# Patient Record
Sex: MALE | Race: WHITE | NOT HISPANIC OR LATINO | Employment: PART TIME | URBAN - NONMETROPOLITAN AREA
[De-identification: names, ages, dates, MRNs, and addresses within clinical notes are randomized per-mention and may not be internally consistent; named-entity substitution may affect disease eponyms.]

---

## 2020-05-27 ENCOUNTER — LAB REQUISITION (OUTPATIENT)
Dept: LAB | Facility: HOSPITAL | Age: 25
End: 2020-05-27

## 2020-05-27 DIAGNOSIS — Z11.59 ENCOUNTER FOR SCREENING FOR OTHER VIRAL DISEASES: ICD-10-CM

## 2020-05-27 PROCEDURE — U0004 COV-19 TEST NON-CDC HGH THRU: HCPCS | Performed by: INTERNAL MEDICINE

## 2020-05-27 PROCEDURE — U0002 COVID-19 LAB TEST NON-CDC: HCPCS | Performed by: INTERNAL MEDICINE

## 2020-05-28 LAB
REF LAB TEST METHOD: NORMAL
SARS-COV-2 RNA RESP QL NAA+PROBE: NOT DETECTED

## 2020-06-23 ENCOUNTER — TELEPHONE (OUTPATIENT)
Dept: URGENT CARE | Facility: CLINIC | Age: 25
End: 2020-06-23

## 2020-06-23 DIAGNOSIS — L03.011 PARONYCHIA OF RIGHT RING FINGER: Primary | ICD-10-CM

## 2020-06-23 RX ORDER — CLINDAMYCIN HYDROCHLORIDE 300 MG/1
300 CAPSULE ORAL 3 TIMES DAILY
Qty: 30 CAPSULE | Refills: 0 | Status: SHIPPED | OUTPATIENT
Start: 2020-06-23

## 2022-05-11 ENCOUNTER — OFFICE VISIT (OUTPATIENT)
Dept: ORTHOPEDIC SURGERY | Age: 27
End: 2022-05-11

## 2022-05-11 VITALS — BODY MASS INDEX: 26.66 KG/M2 | WEIGHT: 160 LBS | HEIGHT: 65 IN

## 2022-05-11 DIAGNOSIS — S62.141A CLOSED DISPLACED FRACTURE OF BODY OF HAMATE OF RIGHT WRIST, INITIAL ENCOUNTER: Primary | ICD-10-CM

## 2022-05-11 DIAGNOSIS — M67.431 GANGLION, RIGHT WRIST: ICD-10-CM

## 2022-05-11 PROCEDURE — 99203 OFFICE O/P NEW LOW 30 MIN: CPT | Performed by: ORTHOPAEDIC SURGERY

## 2022-05-11 RX ORDER — NAPROXEN 375 MG/1
375 TABLET ORAL 2 TIMES DAILY WITH MEALS
COMMUNITY
End: 2022-07-01

## 2022-05-11 NOTE — PROGRESS NOTES
Audrey Khan (: 1995) is a 32 y.o. male patient here for evaluation of the following chief complaint(s):  Hand Pain (right hand)       ASSESSMENT/PLAN:  Below is the assessment and plan developed based on review of pertinent history, physical exam, labs, studies, and medications. 1. Closed displaced fracture of body of hamate of right wrist, initial encounter  -     CT UP EXT RT WO CONT; Future  2. Ganglion, right wrist      77-year-old male with right ring finger metacarpal base intra-articular fracture and intra-articular hamate fracture. I discussed treatment options with the patient and he may need operative intervention. We will obtain a CT scan of his hand to better evaluate the fracture fragments. He will follow-up to discuss those results. Patient verbalized understanding and elected to proceed. All questions were answered to the patient's apparent satisfaction. SUBJECTIVE/OBJECTIVE:  HPI    77-year-old male with a right hand injury. He states he punched a refrigerator yesterday morning. He was seen at [facility].  He was placed in a splint. Patient reports a sudden onset of symptoms. Duration of problem 1 day, began 5/10/2022. Symptom Severity 5/10  Symptom Frequency constant        Allergies   Allergen Reactions    Demerol [Meperidine] Rash       Current Outpatient Medications   Medication Sig    naproxen (NAPROSYN) 375 mg tablet Take 375 mg by mouth two (2) times daily (with meals). No current facility-administered medications for this visit.        Social History     Socioeconomic History    Marital status: SINGLE     Spouse name: Not on file    Number of children: Not on file    Years of education: Not on file    Highest education level: Not on file   Occupational History    Not on file   Tobacco Use    Smoking status: Current Every Day Smoker    Smokeless tobacco: Never Used   Vaping Use    Vaping Use: Some days    Substances: Nicotine    Devices: Pre-filled or refillable cartridge   Substance and Sexual Activity    Alcohol use: Yes    Drug use: Never    Sexual activity: Yes     Partners: Female   Other Topics Concern    Not on file   Social History Narrative    Not on file     Social Determinants of Health     Financial Resource Strain:     Difficulty of Paying Living Expenses: Not on file   Food Insecurity:     Worried About Running Out of Food in the Last Year: Not on file    Leslee of Food in the Last Year: Not on file   Transportation Needs:     Lack of Transportation (Medical): Not on file    Lack of Transportation (Non-Medical): Not on file   Physical Activity:     Days of Exercise per Week: Not on file    Minutes of Exercise per Session: Not on file   Stress:     Feeling of Stress : Not on file   Social Connections:     Frequency of Communication with Friends and Family: Not on file    Frequency of Social Gatherings with Friends and Family: Not on file    Attends Christian Services: Not on file    Active Member of [address] [place] [address] or Organizations: Not on file    Attends Club or Organization Meetings: Not on file    Marital Status: Not on file   Intimate Partner Violence:     Fear of Current or Ex-Partner: Not on file    Emotionally Abused: Not on file    Physically Abused: Not on file    Sexually Abused: Not on file   Housing Stability:     Unable to Pay for Housing in the Last Year: Not on file    Number of [place] in the Last Year: Not on file    Unstable Housing in the Last Year: Not on file       Past Surgical History:   Procedure Laterality Date    FOOT/TOES SURGERY PROC UNLISTED      R foot MRSA       History reviewed. No pertinent family history. Review of Systems    No flowsheet data found. Vitals:  Ht 5' 5\" (1.651 m)   Wt 160 lb (72.6 kg)   BMI 26.63 kg/m²    Estimated body surface area is 1.82 meters squared as calculated from the following:    Height as of this encounter: 5' 5\" (1.651 m).     Weight as of this encounter: 160 lb (72.6 kg). Body mass index is 26.63 kg/m². Physical Exam    Musculoskeletal Exam:    Right Upper Extremity EXAMINATION    Patient has tenderness to palpation at the right hand both over the ring finger metacarpal base and hamate. No other areas of tenderness to palpation. He has intact flexion and extension with no obvious malrotation. There is ecchymosis noted. Mild edema. The patient's splint was removed for examination and he reapplied it afterwards. Patient fires AIN, PIN and ulnar nerves. Sensation is grossly intact in the median, radial and ulnar distribution. Hand is pink and appears well-perfused. Hand is warm. Skin is intact. Compartments are soft and compressible. Consitutional: Healthy  Skin:   - Edema - mild  - Cellulitis - No    Neuro: Numbness or tingling in R/L arm: No    Psych: Affect normal    Cardiovascular: Capillary Refill < 2 seconds in upper extremities    Respiratory: Non-Labored Breathing    ROS:    Constitutional: Denies fever/chills    Respiratory: Denies SOB        Imaging:    I reviewed the x-rays from [facility] noting intra-articular hamate fracture and base of the ring finger metacarpal fracture which is also intra-articular. XR Results (most recent):  No results found for this or any previous visit. Orders Placed This Encounter    CT UP EXT RT WO CONT     Standing Status:   Future     Standing Expiration Date:   6/11/2023     Order Specific Question:   Reason for Exam     Answer:   eval hamate/fourth metacarpal fracture              An electronic signature was used to authenticate this note.   -- Kavita Carias MD

## 2022-05-11 NOTE — PATIENT INSTRUCTIONS
Broken Hand    The bones of the hand serve as a framework. This framework supports the muscles that make the wrist and fingers move. When one of these hand bones is broken (fractured), it can prevent you from using the hand, wrist and fingers. Many people think that a fracture is different from a break, but they are the same (see Figure 1). There can be different variations of a fracture, including:    Stable fracture, when the bone pieces are aligned    Unstable fracture, when there are bone fragments that have shifted    Comminuted fracture, when the bone is shattered into many pieces    Open (compound) fracture, when a bone fragment breaks through the skin. This causes risk of infection. Figure 1  Examples of a broken hand, specifically the fingers        Figure 2  Examples of plates pins and screws used while a broken hand heals        Causes    A broken hand can occur when enough force is applied to a bone to break it. Signs and Symptoms    Symptoms of a broken hand can include:    Pain and stiffness  Difficulty moving the hand, wrist and/or fingers  Deformities such as a crooked finger (this is less likely)    Treatment    Medical evaluation and x-rays are usually needed for your doctor to diagnose the fracture and determine the treatment. Depending on the type of fracture, your hand surgeon may recommend one of several treatment methods. A splint or cast may be used to treat a stable fracture Some unstable fractures, in which the bone has moved, may need to be set and then held in place with wires or pins. This is done without surgery. More serious fractures may need surgery to set the bone and hold the bone fragments together with pins, plates or screws (see Figure 2). Sometimes, bone may be missing or be so severely crushed that it cannot be repaired. In such cases, a bone graft may be necessary. In this procedure, bone is taken from another part of the body.     Recovery    Sometimes, a bony lump may appear at the spot of the broken bone during recovery known as a fracture callus.  This is normal, and the lump usually gets smaller over time. Some problems you may have while your broken hand is healing include:    Stiffness    Shifting of bone  Infection  Slow healing  To increase your chances of a healthy recovery, do not smoke, and carefully follow your doctors instructions. Your doctor may recommend hand therapy to improve the process. It is important to note that not all fractures completely heal. Because bones have such a close relationship with ligaments and tendons, the hand may be stiff and weak even after the healing process. Some fractures may lead to arthritis down the road. In addition, fractures in children occasionally affect future growth of that bone.

## 2022-05-11 NOTE — LETTER
5/11/2022 9:10 AM    Mr. Colin Prather  [address] [address]    Mr. Tim was seen in the office today by Dr. Mohamud Whaley. Please excuse him from work beginning 5-11-22 through 5-25-22 or until advised by physician. If you have any questions or concerns please feel free to give us a call at [phone].           Sincerely,      Stan Siddiqi MD

## 2022-05-12 ENCOUNTER — HOSPITAL ENCOUNTER (OUTPATIENT)
Dept: CT IMAGING | Age: 27
Discharge: HOME | End: 2022-05-12
Attending: ORTHOPAEDIC SURGERY
Payer: MEDICAID

## 2022-05-12 DIAGNOSIS — S62.141A CLOSED DISPLACED FRACTURE OF BODY OF HAMATE OF RIGHT WRIST, INITIAL ENCOUNTER: ICD-10-CM

## 2022-05-12 PROCEDURE — 73200 CT UPPER EXTREMITY W/O DYE: CPT

## 2022-05-13 ENCOUNTER — TELEPHONE (OUTPATIENT)
Dept: ORTHOPEDIC SURGERY | Age: 27
End: 2022-05-13

## 2022-05-13 NOTE — TELEPHONE ENCOUNTER
I discussed treatment options with the patient after reviewing his CT scan. We discussed the intra-articular nature of the fracture as well as subluxation of the ring and small finger metacarpals. In order to provide the optimum outcome we discussed operative intervention with open reduction internal fixation as well as pinning fixation. Patient does want to proceed with operative intervention. We also discussed nonoperative treatment. We will work to get him scheduled next week. We discussed risks, benefits and alternatives to surgery. After thorough discussion ultimately the patient elected to proceed with operative intervention. We discussed the risks including but not limited to postoperative pain, swelling, bruising, bleeding, scarring, infection, damage to neurovascular structures. We also discussed permanent or temporary loss of range of motion, need for additional surgery, rejection of foreign material such as metal, suture or other tissue. We discussed risk of blood clots. Plan is for right hamate open reduction internal fixation, right ring and small finger metacarpal open reduction internal fixation.

## 2022-05-16 DIAGNOSIS — S62.141A CLOSED DISPLACED FRACTURE OF BODY OF HAMATE OF RIGHT WRIST, INITIAL ENCOUNTER: Primary | ICD-10-CM

## 2022-05-17 DIAGNOSIS — S62.141A CLOSED DISPLACED FRACTURE OF BODY OF HAMATE OF RIGHT WRIST, INITIAL ENCOUNTER: Primary | ICD-10-CM

## 2022-05-17 RX ORDER — HYDROCODONE BITARTRATE AND ACETAMINOPHEN 5; 325 MG/1; MG/1
1 TABLET ORAL
Qty: 15 TABLET | Refills: 0 | Status: SHIPPED | OUTPATIENT
Start: 2022-05-17 | End: 2022-05-22

## 2022-06-01 ENCOUNTER — OFFICE VISIT (OUTPATIENT)
Dept: ORTHOPEDIC SURGERY | Age: 27
End: 2022-06-01

## 2022-06-01 VITALS — BODY MASS INDEX: 26.66 KG/M2 | WEIGHT: 160 LBS | HEIGHT: 65 IN

## 2022-06-01 DIAGNOSIS — S62.141D CLOSED DISPLACED FRACTURE OF BODY OF HAMATE OF RIGHT WRIST WITH ROUTINE HEALING, SUBSEQUENT ENCOUNTER: Primary | ICD-10-CM

## 2022-06-01 PROCEDURE — 99024 POSTOP FOLLOW-UP VISIT: CPT | Performed by: ORTHOPAEDIC SURGERY

## 2022-06-01 NOTE — PROGRESS NOTES
Jonah De Leon (: 1995) is a 32 y.o. male patient here for evaluation of the following chief complaint(s):  Hand Pain (right hand sx )       ASSESSMENT/PLAN:  Below is the assessment and plan developed based on review of pertinent history, physical exam, labs, studies, and medications. 1. Closed displaced fracture of body of hamate of right wrist with routine healing, subsequent encounter  -     XR HAND RT MIN 3 V; Future      Patient is doing well at this time. Recommended pin care daily. Nonweightbearing the right upper extremity. He will continue to stay in the splint and work on digit range of motion as well. Follow-up in 1 month and plan for pin removal.    He will get x-rays first 3 views of the right hand. He will monitor for signs of infection. Patient verbalized understanding and elected to proceed. All questions were answered to the patient's apparent satisfaction. SUBJECTIVE/OBJECTIVE:    Patient is here today for a postoperative visit. Date of Surgery: 22    Patient is overall reporting he is doing well today no major pain. No fevers or chills. Allergies   Allergen Reactions    Demerol [Meperidine] Rash       Current Outpatient Medications   Medication Sig    naproxen (NAPROSYN) 375 mg tablet Take 375 mg by mouth two (2) times daily (with meals). No current facility-administered medications for this visit. Social History     Socioeconomic History    Marital status: SINGLE     Spouse name: Not on file    Number of children: Not on file    Years of education: Not on file    Highest education level: Not on file   Occupational History    Not on file   Tobacco Use    Smoking status: Current Every Day Smoker    Smokeless tobacco: Never Used   Vaping Use    Vaping Use: Some days    Substances: Nicotine    Devices: Pre-filled or refillable cartridge   Substance and Sexual Activity    Alcohol use:  Yes    Drug use: Never    Sexual activity: Yes     Partners: Female   Other Topics Concern    Not on file   Social History Narrative    Not on file     Social Determinants of Health     Financial Resource Strain:     Difficulty of Paying Living Expenses: Not on file   Food Insecurity:     Worried About Running Out of Food in the Last Year: Not on file    Leslee of Food in the Last Year: Not on file   Transportation Needs:     Lack of Transportation (Medical): Not on file    Lack of Transportation (Non-Medical): Not on file   Physical Activity:     Days of Exercise per Week: Not on file    Minutes of Exercise per Session: Not on file   Stress:     Feeling of Stress : Not on file   Social Connections:     Frequency of Communication with Friends and Family: Not on file    Frequency of Social Gatherings with Friends and Family: Not on file    Attends Holiness Services: Not on file    Active Member of [address] [place] [address] [employer] or Organizations: Not on file    Attends Club or Organization Meetings: Not on file    Marital Status: Not on file   Intimate Partner Violence:     Fear of Current or Ex-Partner: Not on file    Emotionally Abused: Not on file    Physically Abused: Not on file    Sexually Abused: Not on file   Housing Stability:     Unable to Pay for Housing in the Last Year: Not on file    Number of [place] in the Last Year: Not on file    Unstable Housing in the Last Year: Not on file       Past Surgical History:   Procedure Laterality Date    FOOT/TOES SURGERY PROC UNLISTED      R foot MRSA       History reviewed. No pertinent family history. Review of Systems    No flowsheet data found. Vitals:  Ht 5' 5\" (1.651 m)   Wt 160 lb (72.6 kg)   BMI 26.63 kg/m²    Estimated body surface area is 1.82 meters squared as calculated from the following:    Height as of this encounter: 5' 5\" (1.651 m). Weight as of this encounter: 160 lb (72.6 kg). Body mass index is 26.63 kg/m².        Physical Exam    Musculoskeletal Exam:    Right Upper Extremity Exam:    Evaluation of the patient's postoperative site shows that the incision is intact and healing appropriately. There are no signs of infection, no redness, no warmth, no erythema. There is no purulent drainage noted. Patient fires AIN, PIN and ulnar nerves. Sensation is grossly intact in the median, radial and ulnar distribution. Hand is pink and appears well-perfused. Hand is warm. Consitutional: Healthy  Skin:   - Edema - mild  - Cellulitis - No    Neuro: Numbness or tingling in R/L arm: Reports some tingling mildly over the small finger which is improving he states. Psych: Affect normal    Cardiovascular: Capillary Refill < 2 seconds in upper extremities    Respiratory: Non-Labored Breathing      ROS:    Constitutional: Denies fever/chills    Respiratory: Denies SOB        Imaging:    XR Results (most recent):  Results from Appointment encounter on 06/01/22    XR HAND RT MIN 3 V    Narrative  Right Hand Xray  Indication: pain  Views: 3 views, AP/LAT/OBL    Interpretation: 3 views of the right hand are reviewed and show appropriate changes postoperatively. Hardware remains in appropriate position and the fractures are very well aligned. No interval changes are noted          Orders Placed This Encounter    XR HAND RT MIN 3 V     Please perform x-rays of the right hand to include an AP lateral and oblique views     Standing Status:   Future     Number of Occurrences:   1     Standing Expiration Date:   7/1/2022     Order Specific Question:   Reason for Exam     Answer:   RIGHT HAND PAIN        Procedures:    Patient was rewrapped back in his old splint. An electronic signature was used to authenticate this note.   -- Annie Casas MD

## 2022-07-01 ENCOUNTER — OFFICE VISIT (OUTPATIENT)
Dept: ORTHOPEDIC SURGERY | Age: 27
End: 2022-07-01

## 2022-07-01 VITALS — WEIGHT: 158 LBS | BODY MASS INDEX: 26.33 KG/M2 | HEIGHT: 65 IN

## 2022-07-01 DIAGNOSIS — S62.141D CLOSED DISPLACED FRACTURE OF BODY OF HAMATE OF RIGHT WRIST WITH ROUTINE HEALING, SUBSEQUENT ENCOUNTER: Primary | ICD-10-CM

## 2022-07-01 PROCEDURE — 99024 POSTOP FOLLOW-UP VISIT: CPT | Performed by: ORTHOPAEDIC SURGERY

## 2022-07-01 RX ORDER — NAPROXEN 500 MG/1
500 TABLET ORAL 2 TIMES DAILY
COMMUNITY
Start: 2022-05-10

## 2022-07-01 NOTE — PROGRESS NOTES
Eh Amaya (: 1995) is a 32 y.o. male patient here for evaluation of the following chief complaint(s):  Hand Pain (1mo po right hand)       ASSESSMENT/PLAN:  Below is the assessment and plan developed based on review of pertinent history, physical exam, labs, studies, and medications. 1. Closed displaced fracture of body of hamate of right wrist with routine healing, subsequent encounter  -     XR WRIST RT AP/LAT/OBL MIN 3V; Future      Patient is doing well at this time. We discussed range of motion exercises. We discussed adhesions can form over the extensor tendons. He was given home exercise plan. He will obtain a wrist brace from the store as he does not have insurance and is worried about cost from DME. Follow-up in 6 weeks for likely final check. He will get x-rays first 3 views of the right hand. Patient verbalized understanding and elected to proceed. All questions were answered to the patient's apparent satisfaction. SUBJECTIVE/OBJECTIVE:    Patient is here today for a postoperative visit. Date of Surgery: 22    Patient is overall reporting he is doing well today no major pain. No fevers or chills. Allergies   Allergen Reactions    Demerol [Meperidine] Rash       Current Outpatient Medications   Medication Sig    naproxen (NAPROSYN) 500 mg tablet Take 500 mg by mouth two (2) times a day. No current facility-administered medications for this visit.        Social History     Socioeconomic History    Marital status: SINGLE     Spouse name: Not on file    Number of children: Not on file    Years of education: Not on file    Highest education level: Not on file   Occupational History    Not on file   Tobacco Use    Smoking status: Current Every Day Smoker    Smokeless tobacco: Never Used   Vaping Use    Vaping Use: Some days    Substances: Nicotine    Devices: Pre-filled or refillable cartridge   Substance and Sexual Activity    Alcohol use: Yes    Drug use: Never    Sexual activity: Yes     Partners: Female   Other Topics Concern    Not on file   Social History Narrative    Not on file     Social Determinants of Health     Financial Resource Strain:     Difficulty of Paying Living Expenses: Not on file   Food Insecurity:     Worried About Running Out of Food in the Last Year: Not on file    Leslee of Food in the Last Year: Not on file   Transportation Needs:     Lack of Transportation (Medical): Not on file    Lack of Transportation (Non-Medical): Not on file   Physical Activity:     Days of Exercise per Week: Not on file    Minutes of Exercise per Session: Not on file   Stress:     Feeling of Stress : Not on file   Social Connections:     Frequency of Communication with Friends and Family: Not on file    Frequency of Social Gatherings with Friends and Family: Not on file    Attends Taoism Services: Not on file    Active Member of [address] [place] [address] or Organizations: Not on file    Attends Club or Organization Meetings: Not on file    Marital Status: Not on file   Intimate Partner Violence:     Fear of Current or Ex-Partner: Not on file    Emotionally Abused: Not on file    Physically Abused: Not on file    Sexually Abused: Not on file   Housing Stability:     Unable to Pay for Housing in the Last Year: Not on file    Number of [place] in the Last Year: Not on file    Unstable Housing in the Last Year: Not on file       Past Surgical History:   Procedure Laterality Date    FOOT/TOES SURGERY PROC UNLISTED      R foot MRSA       History reviewed. No pertinent family history. Review of Systems    No flowsheet data found. Vitals:  Ht 5' 5\" (1.651 m)   Wt 158 lb (71.7 kg)   BMI 26.29 kg/m²    Estimated body surface area is 1.81 meters squared as calculated from the following:    Height as of this encounter: 5' 5\" (1.651 m). Weight as of this encounter: 158 lb (71.7 kg). Body mass index is 26.29 kg/m². Physical Exam    Musculoskeletal Exam:    Right Upper Extremity Exam:    Evaluation of the patient's postoperative site shows that the incision is intact and healed appropriately. Pin sites are clean and dry. There are no signs of infection, no redness, no warmth, no erythema. There is no purulent drainage noted. Patient fires AIN, PIN and ulnar nerves. Sensation is grossly intact in the median, radial and ulnar distribution. Hand is pink and appears well-perfused. Hand is warm. Consitutional: Healthy  Skin:   - Edema - mild  - Cellulitis - No    Neuro: Numbness or tingling in R/L arm: None reported today    Psych: Affect normal    Cardiovascular: Capillary Refill < 2 seconds in upper extremities    Respiratory: Non-Labored Breathing      ROS:    Constitutional: Denies fever/chills    Respiratory: Denies SOB        Imaging:    XR Results (most recent):  Results from Appointment encounter on 07/01/22    XR WRIST RT AP/LAT/OBL MIN 3V    Narrative  Right Hand Xray  Indication: pain  Views: 3 views, AP/LAT/OBL    Interpretation: 3 views of the right hand are reviewed and show appropriate changes postoperatively. Hardware remains in appropriate position and the fractures are very well aligned. No interval changes are noted of the alignment today. There is some interval healing noted today. Orders Placed This Encounter    XR WRIST RT AP/LAT/OBL MIN 3V     Please perform 3 views of the right wrist to include an AP lateral and oblique view. Standing Status:   Future     Number of Occurrences:   1     Standing Expiration Date:   8/1/2022     Order Specific Question:   Reason for Exam     Answer:   RIGHT WRIST PAIN        Procedures:    Pins removed without difficulty today. Minimal bleeding and clean bandage was applied. An electronic signature was used to authenticate this note.   -- Jose Sky MD